# Patient Record
Sex: MALE | Race: WHITE
[De-identification: names, ages, dates, MRNs, and addresses within clinical notes are randomized per-mention and may not be internally consistent; named-entity substitution may affect disease eponyms.]

---

## 2021-08-04 ENCOUNTER — HOSPITAL ENCOUNTER (OUTPATIENT)
Dept: HOSPITAL 53 - M PLAIMG | Age: 56
End: 2021-08-04
Attending: UROLOGY
Payer: COMMERCIAL

## 2021-08-04 DIAGNOSIS — N20.0: Primary | ICD-10-CM

## 2021-08-04 NOTE — REP
INDICATION:

CALCULUS OF KIDNEY.



COMPARISON:

None.



FINDINGS:

KUB shows the intestinal gas pattern to be nonspecific.  The organ silhouettes insofar

as delineated are unremarkable.  There is no evidence of free intraperitoneal air.





IMPRESSION:

Nonspecific.





<Electronically signed by Maikel Velasco > 08/04/21 2068

## 2021-08-06 ENCOUNTER — HOSPITAL ENCOUNTER (OUTPATIENT)
Dept: HOSPITAL 53 - M LABSMTC | Age: 56
End: 2021-08-06
Attending: ANESTHESIOLOGY
Payer: COMMERCIAL

## 2021-08-06 DIAGNOSIS — Z01.812: Primary | ICD-10-CM

## 2021-08-06 DIAGNOSIS — Z11.52: ICD-10-CM

## 2021-08-11 ENCOUNTER — HOSPITAL ENCOUNTER (OUTPATIENT)
Dept: HOSPITAL 53 - M OPP | Age: 56
Discharge: HOME | End: 2021-08-11
Attending: SURGERY
Payer: COMMERCIAL

## 2021-08-11 VITALS — DIASTOLIC BLOOD PRESSURE: 74 MMHG | SYSTOLIC BLOOD PRESSURE: 113 MMHG

## 2021-08-11 VITALS — WEIGHT: 193.8 LBS | BODY MASS INDEX: 28.71 KG/M2 | HEIGHT: 69 IN

## 2021-08-11 DIAGNOSIS — Z80.0: ICD-10-CM

## 2021-08-11 DIAGNOSIS — Z79.899: ICD-10-CM

## 2021-08-11 DIAGNOSIS — Z12.11: Primary | ICD-10-CM

## 2021-08-11 DIAGNOSIS — Z80.1: ICD-10-CM

## 2021-08-11 DIAGNOSIS — Z80.42: ICD-10-CM

## 2021-08-11 DIAGNOSIS — Z80.3: ICD-10-CM

## 2021-08-11 DIAGNOSIS — K57.30: ICD-10-CM

## 2021-08-11 NOTE — ROOR
________________________________________________________________________________

Patient Name: Dusty Cueva              Procedure Date: 8/11/2021 10:53 AM

MRN: [id number]                          Account Number: [id number]

YOB: 1965                Age: 56

Room: OPP02                            Gender: Male

Note Status: Finalized                 

________________________________________________________________________________

 

Procedure:            Colonoscopy

Indications:          Screening patient at increased risk: Family history of 

                      colorectal cancer in multiple 1st-degree relatives

Providers:            Kelby Abbasi MD

Referring MD:         Marine Bernal Md

Requesting Provider:  

Medicines:            Monitored Anesthesia Care

Complications:        No immediate complications.

________________________________________________________________________________

Procedure:            Pre-Anesthesia Assessment:

                      - Prior to the procedure, a History and Physical was 

                      performed, and patient medications and allergies were 

                      reviewed. The patient is competent. The risks and 

                      benefits of the procedure and the sedation options and 

                      risks were discussed with the patient. All questions 

                      were answered and informed consent was obtained. Patient 

                      identification and proposed procedure were verified by 

                      the physician, the nurse and the anesthesiologist in the 

                      endoscopy suite. Mental Status Examination: alert and 

                      oriented. Airway Examination: normal oropharyngeal 

                      airway and neck mobility. Respiratory Examination: clear 

                      to auscultation. CV Examination: normal. Prophylactic 

                      Antibiotics: The patient does not require prophylactic 

                      antibiotics. Prior Anticoagulants: The patient has taken 

                      no previous anticoagulant or antiplatelet agents. ASA 

                      Grade Assessment: II - A patient with mild systemic 

                      disease. After reviewing the risks and benefits, the 

                      patient was deemed in satisfactory condition to undergo 

                      the procedure. The anesthesia plan was to use monitored 

                      anesthesia care (MAC). Immediately prior to 

                      administration of medications, the patient was 

                      re-assessed for adequacy to receive sedatives. The heart 

                      rate, respiratory rate, oxygen saturations, blood 

                      pressure, adequacy of pulmonary ventilation, and 

                      response to care were monitored throughout the 

                      procedure. The physical status of the patient was 

                      re-assessed after the procedure.

                      The Colonoscope was introduced through the anus and 

                      advanced to the cecum, identified by appendiceal orifice 

                      and ileocecal valve. The colonoscopy was performed 

                      without difficulty. The patient tolerated the procedure 

                      well. The quality of the bowel preparation was good.

                                                                                

Findings:

     The perianal and digital rectal examinations were normal.

     Multiple small-mouthed diverticula were found in the sigmoid colon.

     The entire examined colon appeared normal.

     No additional abnormalities were found on retroflexion.

                                                                                

Impression:           - Diverticulosis in the sigmoid colon.

                      - The entire examined colon is normal.

                      - No specimens collected.

Recommendation:       - Repeat colonoscopy in 5 years for screening purposes.

                                                                                

Procedure Code(s):    --- Professional ---

                      90653, Colonoscopy, flexible; diagnostic, including 

                      collection of specimen(s) by brushing or washing, when 

                      performed (separate procedure)

Diagnosis Code(s):    --- Professional ---

                      Z80.0, Family history of malignant neoplasm of digestive 

                      organs

                      K57.30, Diverticulosis of large intestine without 

                      perforation or abscess without bleeding

 

CPT copyright 2019 [employer]. All rights reserved.

 

The codes documented in this report are preliminary and upon  review may 

be revised to meet current compliance requirements.

 

Kelby Abbasi MD

_______________________

Kelby Abbasi MD

8/11/2021 11:29:07 AM

Electronically signed by Kelby Abbasi MD

Number of Addenda: 0

 

Note Initiated On: 8/11/2021 10:53 AM

Estimated Blood Loss: Estimated blood loss: none.